# Patient Record
(demographics unavailable — no encounter records)

---

## 2025-04-03 NOTE — ASSESSMENT
[FreeTextEntry1] : This is a 49 yo M who presents for neurosurgical evaluation with regards to severe cervical pain with right upper extremity radiculopathy. Patient warrants a prompt MR C spine w/o contrast along with X-ray C spine flex/ext.  I will issue a Medrol dosepak, use as directed  He has been encouraged to reduce his tobacco use effective immediately.  I will have him return to the office in 2 weeks to review imaging and to devise a continued treatment plan moving forward. He is to contact me with any concerns in the interim.  SANDRA Velazquez MD

## 2025-04-03 NOTE — HISTORY OF PRESENT ILLNESS
[de-identified] : This is a 51 yo M who presents for neurosurgical consultation with regards to severe cervical pain with right upper extremity radiculopathy. Symptoms began quite suddenly over the previous month after pulling garbage out of a pail. The bag became stuck and he had to yank on it causing severe pain into the neck. Over time, numbness/tingling noted to emanate from the lateral cervical segment into the right upper extremity. Weakness reported into the right hand with fine manipulation deficits;  strength deficit onto the right with objects falling from his hand. He presented to the ED for care and was issued an NSAID which failed to provide relief and caused GI upset. Patient utilizes marijuana for pain control with mild benefit noted.  He finds relief by placing his hand onto his right shoulder; this alleviates pressure and reduces his numbness temporarily.   (+) cigarette use, 1ppd x 20+ yrs  IMAGING Prior CT C Spine- PACS- 2023-- degenerative changes C5-6 6-7  PHYSICAL EXAM:   Constitutional: Well appearing, no distress HEENT: Normocephalic Atraumatic Psychiatric: Alert and oriented to all spheres, normal mood Pulmonary: No respiratory distress  Neurologic:  CN II-XII grossly intact Palpation (+) cervical paravertebral tenderness to palpation Strength: R  strength, 3/5. Otherwise 5/5 throughout bilateral upper extremities in all muscle groups tested. Sensation: Full sensation to light touch in all extremities Reflexes:   2+ biceps  2+ triceps   No Freitas's  No clonus  ROM limited to rotation, right.  Gait: steady, walking w/o assistance.

## 2025-04-21 NOTE — ASSESSMENT
[FreeTextEntry1] : This is a 49yo M who presents for neurosurgical follow-up with regards to severe cervical pain with right upper extremity radiculopathy. MRI C-spine reviewed which demonstrates C5-6, C6-C7, C7-T1 severe right and left neural foraminal stenosis. He would be a candidate for a C5-6, C6-7 artificial disc replacement. He wishes to exhaust all forms of conservative therapy before proceeding with surgical intervention.   PLAN: Gabapentin 300 mg QHS   PT 2-3x/week for 6-8 weeks  Pain Management   RTC in 1 month, or sooner should symptoms worsen.    Jordyn Santana MS FNP-BC Nurse Practitioner [facility]  Med Marquez MD FAANS Director, Complex & Adult Spinal Deformity Surgery [facility].

## 2025-04-21 NOTE — DISCUSSION/SUMMARY
[de-identified] : A discussion regarding available pain management treatment options occurred with the patient. These included interventional, rehabilitative, pharmacological, and alternative modalities. We will proceed with the following:   Interventional treatment options: 1. Proceed with a C6-7 cervical epidural steroid injection under fluoroscopic guidance. Treatment options were discussed. The patient has been having persistent, severe neck pain and cervical radicular pain that has minimally improved with conservative therapy thus far (including physical therapy/chiropractic manipulations/home stretching and anti-inflammatory medications for 8 weeks. The patient was given the option of proceeding with a cervical epidural steroid injection to try to get the patient some pain relief. The risks and benefits were discussed, which included the associated problems with steroids, bleeding, nerve injury, lack of improvement with pain, and 0.5% chance of a post dural puncture headache.   Testin. We are obtaining an EMG of the upper extremities to assess the health of muscles and the nerves that control them   Medications: 1. Ordered Gabapentin 400 mg q8h prn (30-day supply, 90 tablets) 2. Ordered Diclofenac Sodium 75 mg q12h (30-day supply, 60 tablets)  We are going to start gabapentin. Potential side effects were discussed which included dizziness, sedation, and pedal edema to name a few. If the patient cannot tolerate these side effects should they occur, then they will stop the medication. If the patient experiences sedation, they understand that they should not drive. The usage of the medication was discussed and the patient understands that it is an anti-epileptic medication used for neuropathic pain and that the pain relief from this medication will likely be subtle, and that hopefully in combination with the other treatments we are offering we will be able to get some additive relief.   Complementary treatment options: - lifestyle modifications discussed - avoid bending and bend with knees - avoid heavy lifting   - Follow up 2 weeks post injection.   I Millicent Carreno attest that this documentation has been prepared under the direction and in the presence of provider Dr. Russ Garner.  The documentation recorded by the scribe in my presence, accurately reflects the service I personally performed, and the decisions made by me with my edits as appropriate.   Russ Garner, DO

## 2025-04-21 NOTE — HISTORY OF PRESENT ILLNESS
[FreeTextEntry1] : Mr. SERRATO is a 51yo M who presents for neurosurgical follow-up with regards to severe cervical pain with right upper extremity radiculopathy.   As a brief review, symptoms began suddenly over the previous month after pulling garbage out of a pail. The bag became stuck, and he had to yank on it causing severe pain into the neck. Over time, he began to experience numbness/tingling from the lateral cervical segment into the right upper extremity. Weakness reported into the right hand with fine manipulation deficits;  strength deficit onto the right with objects falling from his hand. He has not yet trialed conservative therapy.  (+) smoker, 1 PPD x 20 years.  IMAGING: MRI C-spine ([facility], 4/10/25): C5-6, C6-C7, C7-T1 severe right and left neural foraminal stenosis. No cord compression. No cord signal changes.   PHYSICAL EXAM: Constitutional: Well appearing, no distress HEENT: Normocephalic Atraumatic Psychiatric: Alert and oriented to all spheres, normal mood Pulmonary: No respiratory distress  Neurologic: CN II-XII grossly intact Palpation (+) cervical paravertebral tenderness to palpation Strength: R  strength, 3/5. Otherwise, 5/5 throughout bilateral upper extremities in all muscle groups tested. Sensation: Full sensation to light touch in all extremities Reflexes: 2+ biceps 2+ triceps No Freitas's No Clonus ROM limited to rotation, right. Gait: steady, walking w/o assistance.

## 2025-04-21 NOTE — END OF VISIT
[FreeTextEntry3] : ATTENDING ADDENDUM Candidate for ACDF, however, wishes to try medical management/injections first; will see again at 1 month. Mild pain-limited weakness on exam in triceps/biceps I, Dr. Marquez, personally performed the evaluation and management (E/M) services for this patient. That E/M includes conducting the clinically appropriate initial history &/or exam, assessing all conditions, and establishing the plan of care. Today, my LAI, was here to observe my evaluation and management service for this patient & follow plan of care established by me going forward Med Marquez MD Director, Complex and Adult Spinal Deformity Surgery Department of Neurological Surgery [address], [address] [place] [address] Tel: [phone] Email: [email or web address] [Time Spent: ___ minutes] : I have spent [unfilled] minutes of time on the encounter which excludes teaching and separately reported services.

## 2025-04-21 NOTE — HISTORY OF PRESENT ILLNESS
[FreeTextEntry1] : Mr. SERRATO is a 51yo M who presents for neurosurgical follow-up with regards to severe cervical pain with right upper extremity radiculopathy.   As a brief review, symptoms began suddenly over the previous month after pulling garbage out of a pail. The bag became stuck, and he had to yank on it causing severe pain into the neck. Over time, he began to experience numbness/tingling from the lateral cervical segment into the right upper extremity. Weakness reported into the right hand with fine manipulation deficits;  strength deficit onto the right with objects falling from his hand. He has not yet trialed conservative therapy.  (+) smoker, 1 PPD x 20 years.  IMAGING: MRI C-spine ([facility], 4/10/25): C5-6, C6-C7, C7-T1 severe right and left neural foraminal stenosis. No cord compression. No cord signal changes.   PHYSICAL EXAM: Constitutional: Well appearing, no distress HEENT: Normocephalic Atraumatic Psychiatric: Alert and oriented to all spheres, normal mood Pulmonary: No respiratory distress  Neurologic: CN II-XII grossly intact Palpation (+) cervical paravertebral tenderness to palpation Strength: R  strength, 3/5. Otherwise, 5/5 throughout bilateral upper extremities in all muscle groups tested. Sensation: Full sensation to light touch in all extremities Reflexes: 2+ biceps 2+ triceps No Freitas's No Clonus ROM limited to rotation, right. Gait: steady, walking w/o assistance.

## 2025-04-21 NOTE — ASSESSMENT
[FreeTextEntry1] : This is a 49yo M who presents for neurosurgical follow-up with regards to severe cervical pain with right upper extremity radiculopathy. MRI C-spine reviewed which demonstrates C5-6, C6-C7, C7-T1 severe right and left neural foraminal stenosis. He would be a candidate for a C5-6, C6-7 artificial disc replacement. He wishes to exhaust all forms of conservative therapy before proceeding with surgical intervention.   PLAN: Gabapentin 300 mg QHS   PT 2-3x/week for 6-8 weeks  Pain Management   RTC in 1 month, or sooner should symptoms worsen.    Jordyn Santana MS FNP-BC Nurse Practitioner [facility]  Med Marquez MD FAANS Director, Complex & Adult Spinal Deformity Surgery [facility].

## 2025-04-21 NOTE — PHYSICAL EXAM
[de-identified] : Cervical spine:  strength: 4/5 on the right Bicep and Tricep: 4/5 on the right Full sensation to light touch in the upper extremities.

## 2025-04-21 NOTE — DISCUSSION/SUMMARY
[de-identified] : A discussion regarding available pain management treatment options occurred with the patient. These included interventional, rehabilitative, pharmacological, and alternative modalities. We will proceed with the following:   Interventional treatment options: 1. Proceed with a C6-7 cervical epidural steroid injection under fluoroscopic guidance. Treatment options were discussed. The patient has been having persistent, severe neck pain and cervical radicular pain that has minimally improved with conservative therapy thus far (including physical therapy/chiropractic manipulations/home stretching and anti-inflammatory medications for 8 weeks. The patient was given the option of proceeding with a cervical epidural steroid injection to try to get the patient some pain relief. The risks and benefits were discussed, which included the associated problems with steroids, bleeding, nerve injury, lack of improvement with pain, and 0.5% chance of a post dural puncture headache.   Testin. We are obtaining an EMG of the upper extremities to assess the health of muscles and the nerves that control them   Medications: 1. Ordered Gabapentin 400 mg q8h prn (30-day supply, 90 tablets) 2. Ordered Diclofenac Sodium 75 mg q12h (30-day supply, 60 tablets)  We are going to start gabapentin. Potential side effects were discussed which included dizziness, sedation, and pedal edema to name a few. If the patient cannot tolerate these side effects should they occur, then they will stop the medication. If the patient experiences sedation, they understand that they should not drive. The usage of the medication was discussed and the patient understands that it is an anti-epileptic medication used for neuropathic pain and that the pain relief from this medication will likely be subtle, and that hopefully in combination with the other treatments we are offering we will be able to get some additive relief.   Complementary treatment options: - lifestyle modifications discussed - avoid bending and bend with knees - avoid heavy lifting   - Follow up 2 weeks post injection.   I Millicent Carreno attest that this documentation has been prepared under the direction and in the presence of provider Dr. Russ Garner.  The documentation recorded by the scribe in my presence, accurately reflects the service I personally performed, and the decisions made by me with my edits as appropriate.   Russ Garner, DO

## 2025-04-21 NOTE — HISTORY OF PRESENT ILLNESS
[FreeTextEntry1] : Mr. Ordoñez is a 50-year-old male presenting to establish care for his neck and right upper extremity pain. He is being referred by Dr. Marquez. The pain has been ongoing for many years now. About 1 month ago, he was walking in his friend's basement when he hit his head on a beam. He then also went to pull a garbage pail when the bag got stuck and he went to yank on it causing severe pain. The pain is referring all the way into the right hand. He has numbness, tingling as well as significant weakness. He can hardly lift up objects due to the pain or make a fist. His pain is present daily and rated at a 9/10 on the pain scale. His pain has been affecting his quality of life. He denies any bowel/bladder incontinence, fevers/chills, recent weight loss or any saddle anesthesia. He has been managing is pain with Gabapentin 300 mg qhs which has been helping him sleep at night.

## 2025-04-21 NOTE — DATA REVIEWED
[FreeTextEntry1] : MRI C-spine ([facility], 4/10/25): C5-6, C6-C7, C7-T1 severe right and left neural foraminal stenosis. No cord compression. No cord signal changes.

## 2025-04-21 NOTE — PHYSICAL EXAM
[de-identified] : Cervical spine:  strength: 4/5 on the right Bicep and Tricep: 4/5 on the right Full sensation to light touch in the upper extremities.

## 2025-04-21 NOTE — END OF VISIT
[FreeTextEntry3] : ATTENDING ADDENDUM Candidate for ACDF, however, wishes to try medical management/injections first; will see again at 1 month. Mild pain-limited weakness on exam in triceps/biceps I, Dr. Marquez, personally performed the evaluation and management (E/M) services for this patient. That E/M includes conducting the clinically appropriate initial history &/or exam, assessing all conditions, and establishing the plan of care. Today, my LAI, was here to observe my evaluation and management service for this patient & follow plan of care established by me going forward Med Marquez MD Director, Complex and Adult Spinal Deformity Surgery Department of Neurological Surgery [address], [address] [place] [address] Tel: [phone] Email: [email or web address] [Time Spent: ___ minutes] : I have spent [unfilled] minutes of time on the encounter which excludes teaching and separately reported services.

## 2025-04-29 NOTE — PROCEDURE
[FreeTextEntry3] : Date of Service: 04/28/2025   Account: [id number]  Patient: SEVERO SERRATO   YOB: 1974  Age: 50 year  Surgeon:      Russ Garner D.O.  Assistant:    None  Pre-Operative Diagnosis:         Cervical Radiculopathy (M54.12)  Post Operative Diagnosis:       Cervical Radiculopathy (M54.12)  Procedure:             Cervical (C6-C7) interlaminar epidural steroid injection under fluoroscopic guidance  Anesthesia:  none  This procedure was carried out using fluoroscopic guidance.  The risks and benefits of the procedure were discussed extensively with the patient.  The consent of the patient was obtained and the following procedure was performed. The patient was placed in the prone position on the fluoroscopy table and the area was prepped and draped in a sterile fashion.  A timeout was performed with all essential staff present and the site and side were verified.  The patient was placed in the prone position and optimized to patient comfort.  The cervical area was prepped and draped in a sterile fashion.  The fluoroscope visualized the c6-c7 interspace using slight cephalad-caudad angulation and this area was marked.  Using sterile technique the superficial skin was anesthetized with 1% Lidocaine.  A 20 gauge 3.5 inch Tuohy needle was advanced under fluoroscopy until ligament was engaged.  Using a contralateral oblique view, a "loss of resistance" to air technique was utilized in order to gain access to the epidural space.  After negative aspiration for heme and CSF, 1 cc of Omnipaque contrast was administered and the appropriate cervical epidurogram was obtained in the C[place]TON and A/P view as well as digital subtraction angiography.  A total injectate of 3 cc of preservative free normal saline and 10mg decadron was then injected into the epidural space while maintaining meaningful verbal contact with the patient.    The needle was subsequently removed.  Vital signs remained normal.  Pulse oximeter was used throughout the procedure and the patient's pulse and oxygen saturation remained within normal limits.  The patient tolerated the procedure well.  There were no complications.  The patient was instructed to apply ice over the injection sites for twenty minutes every two hours for the next 24 to 48 hours.  Disposition:       1. The patient was advised to F/U in 1-2 weeks to assess the response to the injection.      2. The patient was also instructed to contact me immediately if there were any concerns related to the procedure performed.

## 2025-05-19 NOTE — DISCUSSION/SUMMARY
[de-identified] : A discussion regarding available pain management treatment options occurred with the patient. These included interventional, rehabilitative, pharmacological, and alternative modalities. We will proceed with the following:   Interventional treatment options: C7-t1 CONNIE  Treatment options were discussed. The patient has been having persistent, severe neck pain and cervical radicular pain that has minimally improved with conservative therapy thus far (including physical therapy/chiropractic manipulations/home stretching and anti-inflammatory medications for 8 weeks. The patient was given the option of proceeding with a cervical epidural steroid injection to try to get the patient some pain relief.   The risks and benefits were discussed, which included the associated problems with steroids, bleeding, nerve injury, lack of improvement with pain, and 0.5% chance of a post dural puncture headache.      Medications: 1. d/c gabapentin pregabalin 200mg q8h standing pregabalin ordered. Potential side effects were discussed which included dizziness, sedation, and pedal edema to name a few. If the patient cannot tolerate these side effects should they occur, then they will stop the medication. If the patient experiences sedation, they understand that they should not drive. The usage of the medication was discussed and the patient understands that it is an anti-epileptic medication used for neuropathic pain and that the pain relief from this medication will likely be subtle, and that hopefully in combination with the other treatments we are offering we will be able to get some additive relief.   duloxetine 30mg q12 standing r/b/a  Complementary treatment options: - lifestyle modifications discussed - avoid bending and bend with knees - avoid heavy lifting   - Follow up after his appointment with Dr. Marquez.   I Millicent Carreno attest that this documentation has been prepared under the direction and in the presence of provider Dr. Russ Garner.  The documentation recorded by the scribe in my presence, accurately reflects the service I personally performed, and the decisions made by me with my edits as appropriate.   Russ Garner, DO

## 2025-05-19 NOTE — PHYSICAL EXAM
[de-identified] : Cervical spine:  strength: 4/5 on the right Bicep and Tricep: 4/5 on the right Full sensation to light touch in the upper extremities.

## 2025-05-19 NOTE — HISTORY OF PRESENT ILLNESS
[FreeTextEntry1] : Mr. Ordoñez is a 50-year-old male presenting to establish care for his neck and right upper extremity pain. He is being referred by Dr. Marquez. The pain has been ongoing for many years now. About 1 month ago, he was walking in his friend's basement when he hit his head on a beam. He then also went to pull a garbage pail when the bag got stuck and he went to yank on it causing severe pain. The pain is referring all the way into the right hand. He has numbness, tingling as well as significant weakness. He can hardly lift up objects due to the pain or make a fist. His pain is present daily and rated at a 9/10 on the pain scale. His pain has been affecting his quality of life. He denies any bowel/bladder incontinence, fevers/chills, recent weight loss or any saddle anesthesia. He has been managing is pain with Gabapentin 300 mg qhs which has been helping him sleep at night.   5-: Revisit encounter.   Since his last office visit, he underwent a C6-7 cervical epidural steroid injection on 4-. He notes little to no relief from this procedure. He feels like his pain is worsening post procedure. He is presenting today with ongoing severe pain in the neck with radicular feature into the bilateral upper extremities. He does note some atrophy in the upper extremities. He describes the pain as sharp, shooting, numbness and tingling with weakness in the upper extremities. He rates the pain at a 8/10 on the pain scale. His pain is present daily and is significantly impacting his quality of life.

## 2025-05-19 NOTE — PHYSICAL EXAM
[de-identified] : Cervical spine:  strength: 4/5 on the right Bicep and Tricep: 4/5 on the right Full sensation to light touch in the upper extremities.

## 2025-05-19 NOTE — DISCUSSION/SUMMARY
[de-identified] : A discussion regarding available pain management treatment options occurred with the patient. These included interventional, rehabilitative, pharmacological, and alternative modalities. We will proceed with the following:   Interventional treatment options: C7-t1 CONNIE  Treatment options were discussed. The patient has been having persistent, severe neck pain and cervical radicular pain that has minimally improved with conservative therapy thus far (including physical therapy/chiropractic manipulations/home stretching and anti-inflammatory medications for 8 weeks. The patient was given the option of proceeding with a cervical epidural steroid injection to try to get the patient some pain relief.   The risks and benefits were discussed, which included the associated problems with steroids, bleeding, nerve injury, lack of improvement with pain, and 0.5% chance of a post dural puncture headache.      Medications: 1. d/c gabapentin pregabalin 200mg q8h standing pregabalin ordered. Potential side effects were discussed which included dizziness, sedation, and pedal edema to name a few. If the patient cannot tolerate these side effects should they occur, then they will stop the medication. If the patient experiences sedation, they understand that they should not drive. The usage of the medication was discussed and the patient understands that it is an anti-epileptic medication used for neuropathic pain and that the pain relief from this medication will likely be subtle, and that hopefully in combination with the other treatments we are offering we will be able to get some additive relief.   duloxetine 30mg q12 standing r/b/a  Complementary treatment options: - lifestyle modifications discussed - avoid bending and bend with knees - avoid heavy lifting   - Follow up after his appointment with Dr. Marquez.   I Millicent Carreno attest that this documentation has been prepared under the direction and in the presence of provider Dr. Russ Garner.  The documentation recorded by the scribe in my presence, accurately reflects the service I personally performed, and the decisions made by me with my edits as appropriate.   Russ Garner, DO

## 2025-05-22 NOTE — ASSESSMENT
[FreeTextEntry1] : We thoroughly discussed Mr. Ordoñez's condition. Surgical intervention is an option; C5-C6, C6-7 TDR vs. ACDF. He wishes to continue to exhaust all forms of conservative therapy. He will RTC in 2 months, sooner if symptoms worsen.    Jordyn Santana MS [facility]-BC Nurse Practitioner [facility]  Med Marquez MD FAANS Director, Complex & Adult Spinal Deformity Surgery [facility].

## 2025-05-22 NOTE — HISTORY OF PRESENT ILLNESS
[FreeTextEntry1] : Mr. SERRATO is a 51yo M with cervical spinal stenosis.  As a brief review, symptoms began suddenly over the previous month after pulling garbage out of a pail. The bag became stuck, and he had to yank on it causing severe pain into the neck. Over time, he began to experience numbness/tingling from the lateral cervical segment into the right upper extremity. Weakness reported into the right hand with fine manipulation deficits;  strength deficit onto the right with objects falling from his hand.   (+) smoker, 1 PPD x 20 years.  IMAGING: MRI C-spine ([facility], 4/10/25): C5-6, C6-C7, C7-T1 severe right and left neural foraminal stenosis. No cord compression. No cord signal changes.

## 2025-05-22 NOTE — END OF VISIT
[FreeTextEntry3] : ATTENDING ADDENDUM  I, Dr. Marquez, personally performed the evaluation and management (E/M) services for this patient. That E/M includes conducting the clinically appropriate initial history &/or exam, assessing all conditions, and establishing the plan of care. Today, my LAI, was here to observe my evaluation and management service for this patient & follow plan of care established by me going forward Med Marquez MD Director, Complex and Adult Spinal Deformity Surgery Department of Neurological Surgery [address], [place] [address] Tel: [phone] Email: [email or web address]

## 2025-05-22 NOTE — ASSESSMENT
[FreeTextEntry1] : We thoroughly discussed Mr. Ordoñez's condition. Surgical intervention is an option; C5-C6, C6-7 TDR vs. ACDF. He wishes to continue to exhaust all forms of conservative therapy. He will RTC in 2 months, sooner if symptoms worsen.    Jordyn Santana MS [facility]-BC Nurse Practitioner [facility]  Med Marquez MD FAANS Director, Complex & Adult Spinal Deformity Surgery [facility].

## 2025-05-22 NOTE — END OF VISIT
[FreeTextEntry3] : ATTENDING ADDENDUM  I, Dr. Marquez, personally performed the evaluation and management (E/M) services for this patient. That E/M includes conducting the clinically appropriate initial history &/or exam, assessing all conditions, and establishing the plan of care. Today, my LAI, was here to observe my evaluation and management service for this patient & follow plan of care established by me going forward Med Marquez MD Director, Complex and Adult Spinal Deformity Surgery Department of Neurological Surgery [address], [place] [address] Tel: [phone] Email: [email or web address]

## 2025-05-22 NOTE — HISTORY OF PRESENT ILLNESS
[FreeTextEntry1] : Mr. SERRATO is a 49yo M with cervical spinal stenosis.  As a brief review, symptoms began suddenly over the previous month after pulling garbage out of a pail. The bag became stuck, and he had to yank on it causing severe pain into the neck. Over time, he began to experience numbness/tingling from the lateral cervical segment into the right upper extremity. Weakness reported into the right hand with fine manipulation deficits;  strength deficit onto the right with objects falling from his hand.   (+) smoker, 1 PPD x 20 years.  IMAGING: MRI C-spine ([facility], 4/10/25): C5-6, C6-C7, C7-T1 severe right and left neural foraminal stenosis. No cord compression. No cord signal changes.

## 2025-06-01 NOTE — PROCEDURE
[FreeTextEntry3] : Date of Service: 05/29/2025   Account: [id number]  Patient: SEVERO SERRATO   YOB: 1974  Age: 50 year  Surgeon:      Russ Garner D.O.  Assistant:    None  Pre-Operative Diagnosis:         Cervical Radiculopathy (M54.12)  Post Operative Diagnosis:       Cervical Radiculopathy (M54.12)  Procedure:             Cervical (C7-T1) interlaminar epidural steroid injection under fluoroscopic guidance  Anesthesia:  none  This procedure was carried out using fluoroscopic guidance.  The risks and benefits of the procedure were discussed extensively with the patient.  The consent of the patient was obtained and the following procedure was performed. The patient was placed in the prone position on the fluoroscopy table and the area was prepped and draped in a sterile fashion.  A timeout was performed with all essential staff present and the site and side were verified.  The patient was placed in the prone position and optimized to patient comfort.  The cervical area was prepped and draped in a sterile fashion.  The fluoroscope visualized the C7-T1 interspace using slight cephalad-caudad angulation and this area was marked.  Using sterile technique the superficial skin was anesthetized with 1% Lidocaine.  A 20 gauge 3.5 inch Tuohy needle was advanced under fluoroscopy until ligament was engaged.  Using a contralateral oblique view, a "loss of resistance" to air technique was utilized in order to gain access to the epidural space.  After negative aspiration for heme and CSF, 1 cc of Omnipaque contrast was administered and the appropriate cervical epidurogram was obtained in the [facility] and A/P view as well as digital subtraction angiography.  A total injectate of 3 cc of preservative free normal saline and 10mg decadron was then injected into the epidural space while maintaining meaningful verbal contact with the patient.    The needle was subsequently removed.  Vital signs remained normal.  Pulse oximeter was used throughout the procedure and the patient's pulse and oxygen saturation remained within normal limits.  The patient tolerated the procedure well.  There were no complications.  The patient was instructed to apply ice over the injection sites for twenty minutes every two hours for the next 24 to 48 hours.  Disposition:       1. The patient was advised to F/U in 1-2 weeks to assess the response to the injection.      2. The patient was also instructed to contact me immediately if there were any concerns related to the procedure performed.

## 2025-06-13 NOTE — PHYSICAL EXAM
[de-identified] : Cervical spine:  strength: 4/5 on the right Bicep and Tricep: 4/5 on the right Full sensation to light touch in the upper extremities.

## 2025-06-13 NOTE — HISTORY OF PRESENT ILLNESS
[FreeTextEntry1] : Mr. Ordoñez is a 50-year-old male presenting to establish care for his neck and right upper extremity pain. He is being referred by Dr. Marquez. The pain has been ongoing for many years now. About 1 month ago, he was walking in his friend's basement when he hit his head on a beam. He then also went to pull a garbage pail when the bag got stuck and he went to yank on it causing severe pain. The pain is referring all the way into the right hand. He has numbness, tingling as well as significant weakness. He can hardly lift up objects due to the pain or make a fist. His pain is present daily and rated at a 9/10 on the pain scale. His pain has been affecting his quality of life. He denies any bowel/bladder incontinence, fevers/chills, recent weight loss or any saddle anesthesia. He has been managing is pain with Gabapentin 300 mg qhs which has been helping him sleep at night.   5-: Revisit encounter.   Since his last office visit, he underwent a C6-7 cervical epidural steroid injection on 4-. He notes little to no relief from this procedure. He feels like his pain is worsening post procedure. He is presenting today with ongoing severe pain in the neck with radicular feature into the bilateral upper extremities. He does note some atrophy in the upper extremities. He describes the pain as sharp, shooting, numbness and tingling with weakness in the upper extremities. He rates the pain at a 8/10 on the pain scale. His pain is present daily and is significantly impacting his quality of life.   Revisit encounter (6/11/2025) Patient is a 50-year-old male who is here today for follow-up he is under our care for neck pain with radicular feature going down to his right upper extremity, he is status post his second cervical epidural injection which he states both epidural injection collectively gave him around 5% relief.  He continues to have severe pain going down his right arm, associated with sharp shooting pain, numbness, tingling.  He states that since his last time he feels that his weakness has improved.  He rates his pain 8 out of 10.  He was previously seen by Dr. Mckeon who recommended C5-C6, C6-7 TDR vs. ACDF, however pt defers surgery at this time.  He is medically managed with pregabalin 200 mg 3 times a day and duloxetine once at nighttime, The medication regimen provides satisfactory relief of at least 30-40% and allows the pt to perform their ADLs and improve overall function. The pt uses the medication appropriately and deny any side effects of their medications.

## 2025-06-13 NOTE — DISCUSSION/SUMMARY
[de-identified] : A discussion regarding available pain management treatment options occurred with the patient. These included interventional, rehabilitative, pharmacological, and alternative modalities. We will proceed with the following:   Interventional/ Procedures: 1. None indicated at this time, we will hold off injection therapy since that gave him no relief - He was previously seen by Dr. Mckeon who recommended C5-C6, C6-7 TDR vs. ACDF, however pt defers surgery at this time.    Medication/pharmacological: 1. Ordered pregabalin 200 mg TID  - Sent a 30-day supply - Potential side effects were discussed which included dizziness, sedation, and pedal edema to name a few. If the patient cannot tolerate these side effects should they occur, then they will stop the medication. If the patient experiences sedation, they understand that they should not drive. The usage of the medication was discussed and the patient understands that it is an anti-epileptic medication used for neuropathic pain and that the pain relief from this medication will likely be subtle, and that hopefully in combination with the other treatments we are offering we will be able to get some additive relief. 2. C/W Duloxetine 30 mg QHS (pt states that taking it BID made him too drowsy)  - This is an antidepressant medication that is used to treat chronic nerve pain such as this patient is in. The patient understands risks / benefits and alternatives to this medication. If the patient experiences any unwanted side effects such as sedation, confusion, weight gain, then the patient was advised to stop the medication and call my office. The pt denies the use of any other mood disorders medications. - Pt is not a smoker and they have no Hx of liver disease   Rehabilitative/alternative modalities: 1. Initiate physician directed activity and lifestyle modifications. 2. Participation in active HEP was discussed and printed. 3.Advised not to sleep in fetal position. I advised to sleep with their neck in a straight position with one pillow. 4. PT is doing at home exercises, which is helping him, he defers PT for now.  Total clinician time spent today on the patient is 30 minutes including preparing to see the patient, obtaining and/or reviewing and confirming history, performing medically necessary and appropriate examination, counseling and educating the patient and/or family, documenting clinical information in the EHR and communicating and/or referring to other healthcare professionals.  f/u in 8 weeks for reassessment  SANDRA Slaterirripa, DO

## 2025-06-13 NOTE — DISCUSSION/SUMMARY
[de-identified] : A discussion regarding available pain management treatment options occurred with the patient. These included interventional, rehabilitative, pharmacological, and alternative modalities. We will proceed with the following:   Interventional/ Procedures: 1. None indicated at this time, we will hold off injection therapy since that gave him no relief - He was previously seen by Dr. Mckeon who recommended C5-C6, C6-7 TDR vs. ACDF, however pt defers surgery at this time.    Medication/pharmacological: 1. Ordered pregabalin 200 mg TID  - Sent a 30-day supply - Potential side effects were discussed which included dizziness, sedation, and pedal edema to name a few. If the patient cannot tolerate these side effects should they occur, then they will stop the medication. If the patient experiences sedation, they understand that they should not drive. The usage of the medication was discussed and the patient understands that it is an anti-epileptic medication used for neuropathic pain and that the pain relief from this medication will likely be subtle, and that hopefully in combination with the other treatments we are offering we will be able to get some additive relief. 2. C/W Duloxetine 30 mg QHS (pt states that taking it BID made him too drowsy)  - This is an antidepressant medication that is used to treat chronic nerve pain such as this patient is in. The patient understands risks / benefits and alternatives to this medication. If the patient experiences any unwanted side effects such as sedation, confusion, weight gain, then the patient was advised to stop the medication and call my office. The pt denies the use of any other mood disorders medications. - Pt is not a smoker and they have no Hx of liver disease   Rehabilitative/alternative modalities: 1. Initiate physician directed activity and lifestyle modifications. 2. Participation in active HEP was discussed and printed. 3.Advised not to sleep in fetal position. I advised to sleep with their neck in a straight position with one pillow. 4. PT is doing at home exercises, which is helping him, he defers PT for now.  Total clinician time spent today on the patient is 30 minutes including preparing to see the patient, obtaining and/or reviewing and confirming history, performing medically necessary and appropriate examination, counseling and educating the patient and/or family, documenting clinical information in the EHR and communicating and/or referring to other healthcare professionals.  f/u in 8 weeks for reassessment  SANDRA Slaterirripa, DO

## 2025-06-13 NOTE — PHYSICAL EXAM
[de-identified] : Cervical spine:  strength: 4/5 on the right Bicep and Tricep: 4/5 on the right Full sensation to light touch in the upper extremities.